# Patient Record
Sex: MALE | Race: WHITE | Employment: OTHER | URBAN - METROPOLITAN AREA
[De-identification: names, ages, dates, MRNs, and addresses within clinical notes are randomized per-mention and may not be internally consistent; named-entity substitution may affect disease eponyms.]

---

## 2021-08-15 ENCOUNTER — HOSPITAL ENCOUNTER (EMERGENCY)
Age: 51
Discharge: HOME | End: 2021-08-15
Attending: EMERGENCY MEDICINE
Payer: COMMERCIAL

## 2021-08-15 ENCOUNTER — APPOINTMENT (OUTPATIENT)
Dept: CT IMAGING | Age: 51
End: 2021-08-15
Payer: COMMERCIAL

## 2021-08-15 VITALS
HEIGHT: 71 IN | HEART RATE: 87 BPM | BODY MASS INDEX: 28 KG/M2 | RESPIRATION RATE: 16 BRPM | OXYGEN SATURATION: 96 % | TEMPERATURE: 98.4 F | WEIGHT: 200 LBS | SYSTOLIC BLOOD PRESSURE: 136 MMHG | DIASTOLIC BLOOD PRESSURE: 74 MMHG

## 2021-08-15 DIAGNOSIS — R11.2 NON-INTRACTABLE VOMITING WITH NAUSEA, UNSPECIFIED VOMITING TYPE: Primary | ICD-10-CM

## 2021-08-15 DIAGNOSIS — U07.1 COVID-19: ICD-10-CM

## 2021-08-15 LAB
ALBUMIN SERPL-MCNC: 4.4 G/DL (ref 3.5–5.2)
ALP BLD-CCNC: 65 U/L (ref 40–129)
ALT SERPL-CCNC: 47 U/L (ref 0–40)
ANION GAP SERPL CALCULATED.3IONS-SCNC: 10 MMOL/L (ref 7–16)
AST SERPL-CCNC: 40 U/L (ref 0–39)
BACTERIA: NORMAL /HPF
BASOPHILS ABSOLUTE: 0.01 E9/L (ref 0–0.2)
BASOPHILS RELATIVE PERCENT: 0.3 % (ref 0–2)
BILIRUB SERPL-MCNC: 0.5 MG/DL (ref 0–1.2)
BILIRUBIN URINE: NEGATIVE
BLOOD, URINE: NEGATIVE
BUN BLDV-MCNC: 17 MG/DL (ref 6–20)
CALCIUM SERPL-MCNC: 8.7 MG/DL (ref 8.6–10.2)
CHLORIDE BLD-SCNC: 101 MMOL/L (ref 98–107)
CLARITY: CLEAR
CO2: 25 MMOL/L (ref 22–29)
COLOR: NORMAL
CREAT SERPL-MCNC: 1.1 MG/DL (ref 0.7–1.2)
EOSINOPHILS ABSOLUTE: 0 E9/L (ref 0.05–0.5)
EOSINOPHILS RELATIVE PERCENT: 0 % (ref 0–6)
EPITHELIAL CELLS, UA: NORMAL /HPF
GFR AFRICAN AMERICAN: >60
GFR NON-AFRICAN AMERICAN: >60 ML/MIN/1.73
GLUCOSE BLD-MCNC: 130 MG/DL (ref 74–99)
GLUCOSE URINE: NEGATIVE MG/DL
HCT VFR BLD CALC: 42.8 % (ref 37–54)
HEMOGLOBIN: 15 G/DL (ref 12.5–16.5)
IMMATURE GRANULOCYTES #: 0.02 E9/L
IMMATURE GRANULOCYTES %: 0.6 % (ref 0–5)
KETONES, URINE: NEGATIVE MG/DL
LACTIC ACID: 1 MMOL/L (ref 0.5–2.2)
LEUKOCYTE ESTERASE, URINE: NEGATIVE
LIPASE: 44 U/L (ref 13–60)
LYMPHOCYTES ABSOLUTE: 0.96 E9/L (ref 1.5–4)
LYMPHOCYTES RELATIVE PERCENT: 27.8 % (ref 20–42)
MCH RBC QN AUTO: 30 PG (ref 26–35)
MCHC RBC AUTO-ENTMCNC: 35 % (ref 32–34.5)
MCV RBC AUTO: 85.6 FL (ref 80–99.9)
MONOCYTES ABSOLUTE: 0.3 E9/L (ref 0.1–0.95)
MONOCYTES RELATIVE PERCENT: 8.7 % (ref 2–12)
NEUTROPHILS ABSOLUTE: 2.16 E9/L (ref 1.8–7.3)
NEUTROPHILS RELATIVE PERCENT: 62.6 % (ref 43–80)
NITRITE, URINE: NEGATIVE
PDW BLD-RTO: 12 FL (ref 11.5–15)
PH UA: 6.5 (ref 5–9)
PLATELET # BLD: 141 E9/L (ref 130–450)
PMV BLD AUTO: 9.4 FL (ref 7–12)
POTASSIUM SERPL-SCNC: 3.9 MMOL/L (ref 3.5–5)
PROTEIN UA: NORMAL MG/DL
RBC # BLD: 5 E12/L (ref 3.8–5.8)
RBC UA: NORMAL /HPF (ref 0–2)
SARS-COV-2, NAAT: DETECTED
SODIUM BLD-SCNC: 136 MMOL/L (ref 132–146)
SPECIFIC GRAVITY UA: 1.02 (ref 1–1.03)
TOTAL PROTEIN: 7 G/DL (ref 6.4–8.3)
UROBILINOGEN, URINE: 1 E.U./DL
WBC # BLD: 3.5 E9/L (ref 4.5–11.5)
WBC UA: NORMAL /HPF (ref 0–5)

## 2021-08-15 PROCEDURE — 96374 THER/PROPH/DIAG INJ IV PUSH: CPT

## 2021-08-15 PROCEDURE — 96372 THER/PROPH/DIAG INJ SC/IM: CPT

## 2021-08-15 PROCEDURE — 87635 SARS-COV-2 COVID-19 AMP PRB: CPT

## 2021-08-15 PROCEDURE — 80053 COMPREHEN METABOLIC PANEL: CPT

## 2021-08-15 PROCEDURE — 83690 ASSAY OF LIPASE: CPT

## 2021-08-15 PROCEDURE — 36415 COLL VENOUS BLD VENIPUNCTURE: CPT

## 2021-08-15 PROCEDURE — 81001 URINALYSIS AUTO W/SCOPE: CPT

## 2021-08-15 PROCEDURE — 74177 CT ABD & PELVIS W/CONTRAST: CPT

## 2021-08-15 PROCEDURE — 2580000003 HC RX 258: Performed by: EMERGENCY MEDICINE

## 2021-08-15 PROCEDURE — 6360000002 HC RX W HCPCS: Performed by: EMERGENCY MEDICINE

## 2021-08-15 PROCEDURE — 6360000004 HC RX CONTRAST MEDICATION: Performed by: RADIOLOGY

## 2021-08-15 PROCEDURE — 6370000000 HC RX 637 (ALT 250 FOR IP): Performed by: EMERGENCY MEDICINE

## 2021-08-15 PROCEDURE — 83605 ASSAY OF LACTIC ACID: CPT

## 2021-08-15 PROCEDURE — 85025 COMPLETE CBC W/AUTO DIFF WBC: CPT

## 2021-08-15 PROCEDURE — 99283 EMERGENCY DEPT VISIT LOW MDM: CPT

## 2021-08-15 RX ORDER — PROMETHAZINE HYDROCHLORIDE 25 MG/ML
25 INJECTION, SOLUTION INTRAMUSCULAR; INTRAVENOUS ONCE
Status: COMPLETED | OUTPATIENT
Start: 2021-08-15 | End: 2021-08-15

## 2021-08-15 RX ORDER — DEXAMETHASONE 6 MG/1
6 TABLET ORAL
Qty: 7 TABLET | Refills: 0 | Status: SHIPPED | OUTPATIENT
Start: 2021-08-15 | End: 2021-08-22

## 2021-08-15 RX ORDER — PROMETHAZINE HYDROCHLORIDE 25 MG/1
25 TABLET ORAL EVERY 6 HOURS PRN
Qty: 24 TABLET | Refills: 0 | Status: SHIPPED | OUTPATIENT
Start: 2021-08-15 | End: 2021-08-22

## 2021-08-15 RX ORDER — 0.9 % SODIUM CHLORIDE 0.9 %
1000 INTRAVENOUS SOLUTION INTRAVENOUS ONCE
Status: COMPLETED | OUTPATIENT
Start: 2021-08-15 | End: 2021-08-15

## 2021-08-15 RX ORDER — ONDANSETRON 2 MG/ML
4 INJECTION INTRAMUSCULAR; INTRAVENOUS ONCE
Status: COMPLETED | OUTPATIENT
Start: 2021-08-15 | End: 2021-08-15

## 2021-08-15 RX ADMIN — DEXAMETHASONE 6 MG: 4 TABLET ORAL at 09:00

## 2021-08-15 RX ADMIN — PROMETHAZINE HYDROCHLORIDE 25 MG: 25 INJECTION INTRAMUSCULAR; INTRAVENOUS at 07:06

## 2021-08-15 RX ADMIN — SODIUM CHLORIDE 1000 ML: 9 INJECTION, SOLUTION INTRAVENOUS at 04:41

## 2021-08-15 RX ADMIN — ONDANSETRON 4 MG: 2 INJECTION INTRAMUSCULAR; INTRAVENOUS at 04:41

## 2021-08-15 RX ADMIN — IOPAMIDOL 75 ML: 755 INJECTION, SOLUTION INTRAVENOUS at 05:26

## 2021-08-15 ASSESSMENT — ENCOUNTER SYMPTOMS
WHEEZING: 0
BACK PAIN: 0
ABDOMINAL PAIN: 1
COUGH: 0
DIARRHEA: 0
EYE PAIN: 0
NAUSEA: 1
EYE DISCHARGE: 0
SORE THROAT: 0
SHORTNESS OF BREATH: 0
SINUS PRESSURE: 0
VOMITING: 1
EYE REDNESS: 0

## 2021-08-15 ASSESSMENT — PAIN SCALES - GENERAL: PAINLEVEL_OUTOF10: 8

## 2021-08-15 NOTE — ED PROVIDER NOTES
Patient is a 44 y/o male who presents to the ED with nausea and vomiting. Patient states that he has been nauseated for the past 2 days. He states that he has been unable to eat and has vomited 3 times. He does have generalized abdominal pain. He denies any fever. He denies any diarrhea, dysuria or gross hematuria. Review of Systems   Constitutional: Negative for chills and fever. HENT: Negative for ear pain, sinus pressure and sore throat. Eyes: Negative for pain, discharge and redness. Respiratory: Negative for cough, shortness of breath and wheezing. Cardiovascular: Negative for chest pain. Gastrointestinal: Positive for abdominal pain, nausea and vomiting. Negative for diarrhea. Genitourinary: Negative for dysuria and frequency. Musculoskeletal: Negative for arthralgias and back pain. Skin: Negative for rash and wound. Neurological: Negative for weakness and headaches. Hematological: Negative for adenopathy. All other systems reviewed and are negative. Physical Exam  Vitals and nursing note reviewed. Constitutional:       General: He is not in acute distress. HENT:      Head: Normocephalic and atraumatic. Mouth/Throat:      Mouth: Mucous membranes are moist.   Eyes:      Pupils: Pupils are equal, round, and reactive to light. Cardiovascular:      Rate and Rhythm: Normal rate and regular rhythm. Heart sounds: No murmur heard. Pulmonary:      Effort: Pulmonary effort is normal. No respiratory distress. Breath sounds: Normal breath sounds. No stridor. No wheezing, rhonchi or rales. Abdominal:      General: Bowel sounds are normal. There is no distension. Palpations: Abdomen is soft. Tenderness: There is abdominal tenderness in the epigastric area. There is no guarding. Negative signs include Proctor's sign and McBurney's sign. Skin:     General: Skin is warm and dry. Findings: No rash.    Neurological:      Mental Status: He is alert and oriented to person, place, and time. Procedures     MDM          7:28 AM EDT  I have signed this patient out to the oncoming physician, Dr. Randa Bailey. I have discussed the patient's initial exam, treatment and plan of care with the on coming physician. I have notified the patient / family of the change in treating physician and answered their questions to this point. 7:30 AM EDT  I received this patient at sign out from Dr. Chevis Goltz. I have discussed the patient's initial exam, treatment and plan of care with the out going physician. I have introduced my self to the patient / family and have answered their questions to this point. I have examined the patient myself and reviewed ordered tests / medications and  reviewed any available results to this point. If a resident is involved in the Emergency Department care, I have discussed my findings and plan with them as well.             --------------------------------------------- PAST HISTORY ---------------------------------------------  Past Medical History:  has no past medical history on file. Past Surgical History:  has no past surgical history on file. Social History:      Family History: family history is not on file. The patients home medications have been reviewed. Allergies: Patient has no known allergies.     -------------------------------------------------- RESULTS -------------------------------------------------  Labs:  Results for orders placed or performed during the hospital encounter of 08/15/21   COVID-19, Rapid    Specimen: Nasopharyngeal Swab   Result Value Ref Range    SARS-CoV-2, NAAT DETECTED (A) Not Detected   CBC auto differential   Result Value Ref Range    WBC 3.5 (L) 4.5 - 11.5 E9/L    RBC 5.00 3.80 - 5.80 E12/L    Hemoglobin 15.0 12.5 - 16.5 g/dL    Hematocrit 42.8 37.0 - 54.0 %    MCV 85.6 80.0 - 99.9 fL    MCH 30.0 26.0 - 35.0 pg    MCHC 35.0 (H) 32.0 - 34.5 %    RDW 12.0 11.5 - 15.0 fL    Platelets 235 ABDOMEN PELVIS W IV CONTRAST Additional Contrast? None   Final Result   1. There are multifocal bilateral ground-glass and semi solid pulmonary   infiltrates seen within the lung bases most consistent with pneumonia. The   findings are worrisome for COVID pneumonia. 2. Hepatic steatosis. 3. Multiple low attenuated lesions seen within the spleen of uncertain   etiology. These do not represent simple cyst by CT criteria. Differential   includes hemangiomas  versus underlying pathology. Dedicated triphasic CT   scan of the abdomen is recommended for further evaluation. 4. Prostate megaly. The prostate gland measures 6.2 x 5.4 cm.             ------------------------- NURSING NOTES AND VITALS REVIEWED ---------------------------  Date / Time Roomed:  8/15/2021  4:06 AM  ED Bed Assignment:  10/10    The nursing notes within the ED encounter and vital signs as below have been reviewed. /74   Pulse 87   Temp 98.4 °F (36.9 °C) (Tympanic)   Resp 16   Ht 5' 11\" (1.803 m)   Wt 200 lb (90.7 kg)   SpO2 96%   BMI 27.89 kg/m²   Oxygen Saturation Interpretation: Normal      ------------------------------------------ PROGRESS NOTES ------------------------------------------  8:50 AM EDT  I have spoken with the patient and discussed todays results, in addition to providing specific details for the plan of care and counseling regarding the diagnosis and prognosis. Their questions are answered at this time and they are agreeable with the plan. I discussed at length with them reasons for immediate return here for re evaluation. They will followup with their primary care physician by calling their office tomorrow.     Discussed the home use of the pulse oximeter and need for very close outpatient follow-up.    --------------------------------- ADDITIONAL PROVIDER NOTES ---------------------------------  At this time the patient is without objective evidence of an acute process requiring hospitalization or inpatient management. They have remained hemodynamically stable throughout their entire ED visit and are stable for discharge with outpatient follow-up. The plan has been discussed in detail and they are aware of the specific conditions for emergent return, as well as the importance of follow-up. New Prescriptions    DEXAMETHASONE (DECADRON) 6 MG TABLET    Take 1 tablet by mouth daily (with breakfast) for 7 days    PROMETHAZINE (PHENERGAN) 25 MG TABLET    Take 1 tablet by mouth every 6 hours as needed for Nausea       Diagnosis:  1. Non-intractable vomiting with nausea, unspecified vomiting type    2. COVID-19        Disposition:  Patient's disposition: Discharge to home  Patient's condition is stable.          Sandra Tellez [place]  08/15/21 [id number]

## 2021-08-16 ENCOUNTER — CARE COORDINATION (OUTPATIENT)
Dept: CARE COORDINATION | Age: 51
End: 2021-08-16

## 2021-08-16 NOTE — CARE COORDINATION
Date/Time:  8/16/2021 9:53 AM  Attempted to reach patient by telephone. Call within 2 business days of discharge: Yes Left HIPPA compliant message requesting a return call. Will attempt to reach patient again.

## 2021-08-17 NOTE — CARE COORDINATION
Patient contacted regarding COVID-19 risk, exposure, diagnosis, pulse oximeter ordered at discharge and monoclonal antibody infusion follow up. Discussed COVID-19 related testing which was available at this time. Test results were positive. Patient informed of results, if available? Yes. LPN Care Coordinator contacted the patient by telephone to perform post discharge assessment. Call within 2 business days of discharge: Yes. Verified name and  with patient as identifiers. Provided introduction to self, and explanation of the CTN/ACM role, and reason for call due to risk factors for infection and/or exposure to COVID-19. Symptoms reviewed with patient who verbalized the following symptoms: fatigue, pain or aching joints and nausea. Due to no new or worsening symptoms encounter was not routed to provider for escalation. Discussed follow-up appointments. If no appointment was previously scheduled, appointment scheduling offered: No.  [facility] follow up appointment(s): No future appointments. [facility] follow up appointment(s): pt states he is feeling a little better advised to follow up with pcp and to increase fluids reviewed cdc guidelines and reminded pt to return to the er if symptoms persist or worsen will follow up with pt in one week     Non-face-to-face services provided:  Obtained and reviewed discharge summary and/or continuity of care documents     Advance Care Planning:   Does patient have an Advance Directive:  not on file. Educated patient about risk for severe COVID-19 due to risk factors according to CDC guidelines. LPN CC reviewed discharge instructions, medical action plan and red flag symptoms with the patient who verbalized understanding. Discussed COVID vaccination status: No. Education provided on COVID-19 vaccination as appropriate. Discussed exposure protocols and quarantine with CDC Guidelines.  Patient was given an opportunity to verbalize any questions and concerns and agrees to contact LPN CC or health care provider for questions related to their healthcare. Reviewed and educated patient on any new and changed medications related to discharge diagnosis     Was patient discharged with a pulse oximeter? No Discussed and confirmed pulse oximeter discharge instructions and when to notify provider or seek emergency care. LPN CC provided contact information. Plan for follow-up call in 5-7 days based on severity of symptoms and risk factors.

## 2021-08-23 ENCOUNTER — CARE COORDINATION (OUTPATIENT)
Dept: CARE COORDINATION | Age: 51
End: 2021-08-23

## 2021-08-23 NOTE — CARE COORDINATION
Date/Time:  8/23/2021 4:24 PM  Attempted to reach patient by telephone. Call within 2 business days of discharge: Yes Left HIPPA compliant message requesting a return call. Will attempt to reach patient again.

## 2021-08-27 ENCOUNTER — CARE COORDINATION (OUTPATIENT)
Dept: CARE COORDINATION | Age: 51
End: 2021-08-27

## 2021-08-27 NOTE — CARE COORDINATION
You Patient resolved from the Care Transitions episode on 8/27/21  Discussed COVID-19 related testing which was available at this time. Test results were positive. Patient informed of results, if available? Yes    Patient/family has been provided the following resources and education related to COVID-19:                         Signs, symptoms and red flags related to COVID-19            CDC exposure and quarantine guidelines            [facility] exposure contact - [phone]            Contact for their local Department of Health                 Patient currently reports that the following symptoms have improved:  cough ha fatigue     No further outreach scheduled with this CTN/ACM. Episode of Care resolved. Patient has this CTN/ACM contact information if future needs arise.

## 2022-09-18 ENCOUNTER — HOSPITAL ENCOUNTER (EMERGENCY)
Age: 52
Discharge: HOME | End: 2022-09-18
Attending: EMERGENCY MEDICINE
Payer: COMMERCIAL

## 2022-09-18 VITALS
WEIGHT: 205 LBS | RESPIRATION RATE: 18 BRPM | HEART RATE: 80 BPM | OXYGEN SATURATION: 97 % | HEIGHT: 71 IN | TEMPERATURE: 97.7 F | DIASTOLIC BLOOD PRESSURE: 88 MMHG | BODY MASS INDEX: 28.7 KG/M2 | SYSTOLIC BLOOD PRESSURE: 136 MMHG

## 2022-09-18 DIAGNOSIS — N30.00 ACUTE CYSTITIS WITHOUT HEMATURIA: ICD-10-CM

## 2022-09-18 DIAGNOSIS — N41.0 ACUTE PROSTATITIS: Primary | ICD-10-CM

## 2022-09-18 LAB
BACTERIA: ABNORMAL /HPF
BILIRUBIN URINE: NEGATIVE
BLOOD, URINE: NEGATIVE
CLARITY: CLEAR
COLOR: ABNORMAL
GLUCOSE URINE: 100 MG/DL
KETONES, URINE: NEGATIVE MG/DL
LEUKOCYTE ESTERASE, URINE: NEGATIVE
NITRITE, URINE: POSITIVE
PH UA: 6 (ref 5–9)
PROTEIN UA: NEGATIVE MG/DL
RBC UA: ABNORMAL /HPF (ref 0–2)
SPECIFIC GRAVITY UA: 1.01 (ref 1–1.03)
UROBILINOGEN, URINE: 2 E.U./DL
WBC UA: ABNORMAL /HPF (ref 0–5)

## 2022-09-18 PROCEDURE — 87088 URINE BACTERIA CULTURE: CPT

## 2022-09-18 PROCEDURE — 81001 URINALYSIS AUTO W/SCOPE: CPT

## 2022-09-18 PROCEDURE — 99283 EMERGENCY DEPT VISIT LOW MDM: CPT

## 2022-09-18 RX ORDER — SULFAMETHOXAZOLE AND TRIMETHOPRIM 800; 160 MG/1; MG/1
1 TABLET ORAL 2 TIMES DAILY
Qty: 56 TABLET | Refills: 0 | Status: SHIPPED | OUTPATIENT
Start: 2022-09-18 | End: 2022-10-16

## 2022-09-18 ASSESSMENT — ENCOUNTER SYMPTOMS
ABDOMINAL PAIN: 0
WHEEZING: 0
DIARRHEA: 0
SHORTNESS OF BREATH: 0
CONSTIPATION: 0
TROUBLE SWALLOWING: 0
EYE ITCHING: 0
CHEST TIGHTNESS: 0
BACK PAIN: 0
ABDOMINAL DISTENTION: 0
NAUSEA: 0
EYE REDNESS: 0
VOMITING: 0
RHINORRHEA: 0

## 2022-09-18 ASSESSMENT — PAIN - FUNCTIONAL ASSESSMENT: PAIN_FUNCTIONAL_ASSESSMENT: NONE - DENIES PAIN

## 2022-09-18 ASSESSMENT — LIFESTYLE VARIABLES: HOW OFTEN DO YOU HAVE A DRINK CONTAINING ALCOHOL: NEVER

## 2022-09-18 NOTE — ED NOTES
Discharge instructions with Rx reviewed with Rx, patient verbalized understanding.      Ahsan Last RN  09/18/22 [id number]

## 2022-09-18 NOTE — ED PROVIDER NOTES
Viji Christian is a 47 y.o. male    Chief Complaint   Patient presents with    Other     Currently being treated for uti, having difficulties urinating (constant feeling of needing to urinate and nothing comes out) feels as if there is a blockage         HPI   Viji Christian is a 47 y.o. male presenting to the ED for Other (Currently being treated for uti, having difficulties urinating (constant feeling of needing to urinate and nothing comes out) feels as if there is a blockage)    History comes primarily from the patient. Presents to the emergency room with feelings of incomplete emptying. This been going on for the last 12 hours or so. The patient is currently receiving treatment for urinary tract infection which was diagnosed 6 days ago. He is currently on a course of Bactrim and also taking over-the-counter Uricalm (phenizopyridine). When he was initially diagnosed with a UTI, he was experiencing body aches, chills, fevers though these have dissipated several days ago and is not experiencing any of these now. His symptoms currently are a feeling of incomplete emptying and that he only gets small amounts of urine at times and feels like he has to go often. He has not had any return of the systemic symptoms. Review of Systems   Constitutional:  Negative for appetite change, fatigue and fever. HENT:  Negative for congestion, rhinorrhea and trouble swallowing. Eyes:  Negative for redness and itching. Respiratory:  Negative for chest tightness, shortness of breath and wheezing. Cardiovascular:  Negative for chest pain, palpitations and leg swelling. Gastrointestinal:  Negative for abdominal distention, abdominal pain, constipation, diarrhea, nausea and vomiting. Genitourinary:  Positive for decreased urine volume and frequency. Negative for difficulty urinating. Musculoskeletal:  Negative for arthralgias, back pain and myalgias.    Neurological:  Negative for dizziness, syncope, weakness, numbness and headaches. Psychiatric/Behavioral:  Negative for agitation, behavioral problems, confusion and decreased concentration. The patient is not nervous/anxious. All other systems reviewed and are negative. Physical Exam  Vitals reviewed. Constitutional:       General: He is not in acute distress. Appearance: Normal appearance. He is not ill-appearing. HENT:      Head: Normocephalic and atraumatic. Right Ear: External ear normal.      Left Ear: External ear normal.      Nose: Nose normal. No rhinorrhea. Mouth/Throat:      Mouth: Mucous membranes are moist.      Pharynx: Oropharynx is clear. Eyes:      Extraocular Movements: Extraocular movements intact. Conjunctiva/sclera: Conjunctivae normal.      Pupils: Pupils are equal, round, and reactive to light. Cardiovascular:      Rate and Rhythm: Normal rate and regular rhythm. Heart sounds: Normal heart sounds. No murmur heard. Pulmonary:      Effort: Pulmonary effort is normal. No respiratory distress. Breath sounds: Normal breath sounds. Abdominal:      General: Abdomen is flat. There is no distension. Tenderness: There is no abdominal tenderness. Genitourinary:     Comments: Prostate firm, not enlarged or boggy. Musculoskeletal:         General: No swelling or tenderness. Normal range of motion. Cervical back: Normal range of motion. No rigidity or tenderness. Skin:     General: Skin is warm and dry. Findings: No rash. Neurological:      General: No focal deficit present. Mental Status: He is alert and oriented to person, place, and time. Cranial Nerves: No cranial nerve deficit. Motor: No weakness.    Psychiatric:         Mood and Affect: Mood normal.         Behavior: Behavior normal.        Procedures     MDM  Patient presented to the Emergency Department for Other (Currently being treated for uti, having difficulties urinating (constant feeling of needing to urinate and nothing comes out) feels as if there is a blockage)      Patient appears to still have a urinary tract infection despite treatment with bactrim. However, upon further inspection and digital prostate exam, there is concern for prostatitis. With that in mind, the patient will be prescribed an additional 4 weeks of bactrim as it is still an appropriate antibiotic. The patient will be referred for follow up with urology for further management. Patient to be discharged and agrees with the plan. ED Course as of 09/18/22 1604   Sun Sep 18, 2022   0700 Prevoid bladder US ~330mL of urine. [KS]   0904 Post void volume by US 150mL [KS]      ED Course User Index  [KS] Leatha Pyle MD       --------------------------------------------- PAST HISTORY ---------------------------------------------  Past Medical History:  has no past medical history on file. Past Surgical History:  has no past surgical history on file. Social History:  reports that he has never smoked. He has never used smokeless tobacco.    Family History: family history is not on file. The patients home medications have been reviewed. Allergies: Patient has no known allergies.     -------------------------------------------------- RESULTS -------------------------------------------------  Labs:  Results for orders placed or performed during the hospital encounter of 09/18/22   Urinalysis with Microscopic   Result Value Ref Range    Color, UA ADILSON (A) Straw/Yellow    Clarity, UA Clear Clear    Glucose, Ur 100 (A) Negative mg/dL    Bilirubin Urine Negative Negative    Ketones, Urine Negative Negative mg/dL    Specific Gravity, UA 1.015 1.005 - 1.030    Blood, Urine Negative Negative    pH, UA 6.0 5.0 - 9.0    Protein, UA Negative Negative mg/dL    Urobilinogen, Urine 2.0 (A) <2.0 E.U./dL    Nitrite, Urine POSITIVE (A) Negative    Leukocyte Esterase, Urine Negative Negative    WBC, UA NONE 0 - 5 /HPF    RBC, UA NONE 0 - 2 /HPF    Bacteria, UA NONE SEEN None Seen /HPF       Radiology:  No orders to display       ------------------------- NURSING NOTES AND VITALS REVIEWED ---------------------------  Date / Time Roomed:  9/18/2022  6:40 AM  ED Bed Assignment:  06/06    The nursing notes within the ED encounter and vital signs as below have been reviewed. /88   Pulse 80   Temp 97.7 °F (36.5 °C)   Resp 18   Ht 5' 11\" (1.803 m)   Wt 205 lb (93 kg)   SpO2 97%   BMI 28.59 kg/m²   Oxygen Saturation Interpretation: Normal      ------------------------------------------ PROGRESS NOTES ------------------------------------------  4:04 PM EDT  I have spoken with the patient and discussed todays results, in addition to providing specific details for the plan of care and counseling regarding the diagnosis and prognosis. Their questions are answered at this time and they are agreeable with the plan. I discussed at length with them reasons for immediate return here for re evaluation. They will followup with their  Urology and primary care physician by calling their office on Monday.      --------------------------------- ADDITIONAL PROVIDER NOTES ---------------------------------  At this time the patient is without objective evidence of an acute process requiring hospitalization or inpatient management. They have remained hemodynamically stable throughout their entire ED visit and are stable for discharge with outpatient follow-up. The plan has been discussed in detail and they are aware of the specific conditions for emergent return, as well as the importance of follow-up. Discharge Medication List as of 9/18/2022  8:10 AM        START taking these medications    Details   sulfamethoxazole-trimethoprim (BACTRIM DS;SEPTRA DS) 800-160 MG per tablet Take 1 tablet by mouth 2 times daily for 28 days, Disp-56 tablet, R-0Print             Diagnosis:  1. Acute prostatitis    2.  Acute cystitis without hematuria        Disposition:  Patient's disposition: Discharge to home  Patient's condition is stable. Strict return precautions were discussed including but not limited too new or worsening symtpoms. They verbalized understanding and were agreeable with the plan. All questions were answered and patient was discharged.        Margot Barahona MD  Resident  09/18/22 [id number]

## 2022-09-20 LAB — URINE CULTURE, ROUTINE: NORMAL

## 2022-10-08 ENCOUNTER — HOSPITAL ENCOUNTER (EMERGENCY)
Age: 52
Discharge: HOME | End: 2022-10-08
Payer: COMMERCIAL

## 2022-10-08 ENCOUNTER — APPOINTMENT (OUTPATIENT)
Dept: GENERAL RADIOLOGY | Age: 52
End: 2022-10-08
Payer: COMMERCIAL

## 2022-10-08 VITALS
SYSTOLIC BLOOD PRESSURE: 142 MMHG | HEART RATE: 89 BPM | HEIGHT: 71 IN | RESPIRATION RATE: 18 BRPM | OXYGEN SATURATION: 98 % | BODY MASS INDEX: 27.44 KG/M2 | DIASTOLIC BLOOD PRESSURE: 90 MMHG | WEIGHT: 196 LBS | TEMPERATURE: 98.7 F

## 2022-10-08 DIAGNOSIS — J18.9 PNEUMONIA OF LEFT LOWER LOBE DUE TO INFECTIOUS ORGANISM: Primary | ICD-10-CM

## 2022-10-08 PROCEDURE — 71046 X-RAY EXAM CHEST 2 VIEWS: CPT

## 2022-10-08 PROCEDURE — 99211 OFF/OP EST MAY X REQ PHY/QHP: CPT

## 2022-10-08 RX ORDER — AZITHROMYCIN 250 MG/1
TABLET, FILM COATED ORAL
Qty: 6 TABLET | Refills: 0 | Status: SHIPPED | OUTPATIENT
Start: 2022-10-08 | End: 2022-10-18

## 2022-10-08 ASSESSMENT — PAIN SCALES - GENERAL: PAINLEVEL_OUTOF10: 0

## 2022-10-08 ASSESSMENT — PAIN - FUNCTIONAL ASSESSMENT: PAIN_FUNCTIONAL_ASSESSMENT: 0-10

## 2022-10-08 NOTE — ED PROVIDER NOTES
[facility]  Provider Note  Admit Date/Time: 10/8/2022  1:42 PM  Room:   NAME: Chelsea Meyer  : 1970  MRN: [id number]     Chief Complaint:  Other (Patient states that he is taking an a long term ATB and is having all the side effects of the ATB. )    History of Present Illness        Chelsea Meyer is a 47 y.o. male who has a past medical history of: History reviewed. No pertinent past medical history. presents to the urgent care center by private car for evaluation. He has been taking Bactrim long-term over the past 3 to 4 weeks for prostatitis. He did see a urologist and was advised to continue taking that as ordered in the ED. He said his urinary symptoms are better but then he developed a rash. He said he also feels like he is short of breath and he has a cough. He said he also feels like he has had a fever. He did a COVID test and it was negative. He said he is stopped the antibiotic and he said he is feeling better but he wants to make sure he does not have pneumonia. He does not have chest pain but he does have a cough. ROS    Pertinent positives and negatives are stated within HPI, all other systems reviewed and are negative. History reviewed. No pertinent surgical history. Social History:  reports that he has never smoked. He has never used smokeless tobacco.  Family History: family history is not on file. Allergies: Patient has no known allergies. Physical Exam   Oxygen Saturation Interpretation: Normal.   ED Triage Vitals [10/08/22 1344]   BP Temp Temp Source Heart Rate Resp SpO2 Height Weight   (!) 142/90 98.7 °F (37.1 °C) Infrared 89 18 98 % 5' 11\" (1.803 m) 196 lb (88.9 kg)       Physical Exam  Constitutional/General: Alert and oriented x3, well appearing, non toxic in NAD  HEENT:  NC/NT. Neck: Supple, full ROM,   Respiratory: Lungs clear to auscultation bilaterally, no wheezes, rales, or rhonchi.  Not in respiratory distress  CV:  Regular rate. Regular rhythm. Chest: No chest wall tenderness  GI:  Abdomen Soft, Non tender, Non distended. +BS. No rebound, guarding, or rigidity. No pulsatile masses. Musculoskeletal: Moves all extremities x 4. Integument: skin warm and dry. Fine red rash on the right abdomen and right arm  Lymphatic: no lymphadenopathy noted  Neurologic: GCS 15, no focal deficits, symmetric strength 5/5 in the upper and lower extremities bilaterally  Psychiatric: Normal Affect    Lab / Imaging Results   (All laboratory and radiology results have been personally reviewed by myself)  Labs:  No results found for this visit on 10/08/22. Imaging: All Radiology results interpreted by Radiologist unless otherwise noted. XR CHEST (2 VW)   Final Result   Lingular opacity concerning for underlying infectious/inflammatory process. No pleural effusion or pneumothorax. RECOMMENDATION:   (Recommend upright PA and lateral chest radiographs 10-12 weeks after   resolution of patient's symptoms to ensure complete resolution of   radiographic findings.)             ED Course / Medical Decision Making   Medications - No data to display       Consult(s):   None      MDM:   I did do a chest xray and he has an  area of inflammation/infection in the left lingula. He has already stopped the bactrim since he has the rash and will talk to his urologist Monday. I did put him on a z-pack for the possibility that this is pneumonia. His sat is 98%, he is in do distress. He was advised that the radiologist suggests that he get a repeat xay in 101-2 weeks. He should see his PCP for that and was also advised to follow up with his PCP asap. If any further symptoms or worsening, he was advised to go to the ED. Plan of Care/Counseling:  I reviewed today's visit with the patient in addition to providing specific details for the plan of care and counseling regarding the diagnosis and prognosis.   Questions are answered <<----- Click to add NO pertinent Family History

## 2022-12-29 LAB — PROSTATE SPECIFIC ANTIGEN: 5.68 NG/ML (ref 0–4)

## 2023-01-27 LAB — PROSTATE SPECIFIC ANTIGEN: 6.37 NG/ML (ref 0–4)

## 2023-10-04 LAB — PSA SERPL-MCNC: 4.47 NG/ML (ref 0–4)

## 2024-04-30 LAB
PSA FREE MFR SERPL: 14.3 %
PSA FREE SERPL-MCNC: 0.6 UG/L
PSA SERPL-MCNC: 4.2 NG/ML (ref 0–4)

## 2025-04-23 LAB — PSA SERPL-MCNC: 6.5 NG/ML (ref 0–4)

## 2025-08-01 LAB — PSA SERPL-MCNC: 4.7 NG/ML (ref 0–4)
